# Patient Record
Sex: MALE | Race: WHITE | Employment: OTHER | URBAN - METROPOLITAN AREA
[De-identification: names, ages, dates, MRNs, and addresses within clinical notes are randomized per-mention and may not be internally consistent; named-entity substitution may affect disease eponyms.]

---

## 2018-06-04 PROBLEM — R47.01 EXPRESSIVE APHASIA: Status: ACTIVE | Noted: 2018-06-04

## 2018-06-04 PROBLEM — Z87.820 HISTORY OF TRAUMATIC BRAIN INJURY: Status: ACTIVE | Noted: 2018-06-04

## 2018-06-28 PROBLEM — M25.521 PAIN OF BOTH ELBOWS: Status: ACTIVE | Noted: 2018-06-28

## 2018-06-28 PROBLEM — M25.522 PAIN OF BOTH ELBOWS: Status: ACTIVE | Noted: 2018-06-28

## 2018-09-04 PROBLEM — F43.0 ACUTE STRESS DISORDER: Status: ACTIVE | Noted: 2018-09-04

## 2018-09-04 PROBLEM — G47.20 SLEEP PATTERN DISTURBANCE: Status: ACTIVE | Noted: 2018-09-04

## 2018-09-04 PROBLEM — N52.9 PRIMARY ERECTILE DYSFUNCTION: Status: ACTIVE | Noted: 2018-03-06

## 2018-09-04 PROBLEM — B07.0 VERRUCA PLANTARIS: Status: ACTIVE | Noted: 2018-09-04

## 2018-09-04 PROBLEM — F41.8 MIXED ANXIETY AND DEPRESSIVE DISORDER: Status: ACTIVE | Noted: 2018-09-04

## 2018-09-04 PROBLEM — F41.1 GENERALIZED ANXIETY DISORDER: Status: ACTIVE | Noted: 2018-09-04

## 2018-09-04 PROBLEM — S09.90XA CLOSED INJURY OF HEAD: Status: ACTIVE | Noted: 2018-09-04

## 2018-09-07 PROBLEM — M54.31 SCIATICA OF RIGHT SIDE: Status: ACTIVE | Noted: 2018-09-07

## 2018-10-16 PROBLEM — M43.16 SPONDYLOLISTHESIS OF LUMBAR REGION: Status: ACTIVE | Noted: 2018-10-16

## 2018-12-04 PROBLEM — F32.5 MAJOR DEPRESSIVE DISORDER WITH SINGLE EPISODE, IN FULL REMISSION (HCC): Status: ACTIVE | Noted: 2018-12-04

## 2018-12-17 PROBLEM — D35.02 ADRENAL ADENOMA, LEFT: Status: ACTIVE | Noted: 2018-12-17

## 2018-12-19 ENCOUNTER — HOSPITAL ENCOUNTER (OUTPATIENT)
Dept: PHYSICAL THERAPY | Facility: HOSPITAL | Age: 58
Discharge: HOME | End: 2018-12-19
Attending: ORTHOPAEDIC SURGERY
Payer: MEDICARE

## 2018-12-19 DIAGNOSIS — M43.16 SPONDYLOLISTHESIS, LUMBAR REGION: ICD-10-CM

## 2018-12-19 RX ORDER — MULTIVIT-MIN/IRON FUM/FOLIC AC 7.5 MG-4
1 TABLET ORAL DAILY
Status: ON HOLD | COMMUNITY
End: 2019-01-24

## 2018-12-19 RX ORDER — CHLORAL HYDRATE 500 MG
1 CAPSULE ORAL DAILY
Status: ON HOLD | COMMUNITY
End: 2019-01-23

## 2018-12-19 RX ORDER — MULTIVIT WITH MINERALS/LUTEIN
250 TABLET ORAL DAILY
Status: ON HOLD | COMMUNITY
End: 2019-01-24

## 2018-12-19 RX ORDER — CHLORAL HYDRATE 500 MG
1000 CAPSULE ORAL DAILY
Status: ON HOLD | COMMUNITY
End: 2019-01-24

## 2019-01-03 PROCEDURE — 81003 URINALYSIS AUTO W/O SCOPE: CPT | Performed by: FAMILY MEDICINE

## 2019-01-22 ENCOUNTER — ANESTHESIA EVENT (OUTPATIENT)
Dept: SURGERY | Facility: HOSPITAL | Age: 59
End: 2019-01-22

## 2019-01-23 ENCOUNTER — HOSPITAL ENCOUNTER (INPATIENT)
Facility: HOSPITAL | Age: 59
LOS: 1 days | Discharge: HOME | DRG: 455 | End: 2019-01-24
Attending: ORTHOPAEDIC SURGERY | Admitting: ORTHOPAEDIC SURGERY
Payer: MEDICARE

## 2019-01-23 ENCOUNTER — APPOINTMENT (OUTPATIENT)
Dept: GENERAL RADIOLOGY | Facility: HOSPITAL | Age: 59
DRG: 455 | End: 2019-01-23
Attending: ORTHOPAEDIC SURGERY
Payer: MEDICARE

## 2019-01-23 ENCOUNTER — ANESTHESIA (OUTPATIENT)
Dept: SURGERY | Facility: HOSPITAL | Age: 59
End: 2019-01-23

## 2019-01-23 DIAGNOSIS — M43.16 SPONDYLOLISTHESIS, LUMBAR REGION: Primary | ICD-10-CM

## 2019-01-23 LAB
ERYTHROCYTE [DISTWIDTH] IN BLOOD BY AUTOMATED COUNT: 12.1 % (ref 11.5–16)
HCT VFR BLD AUTO: 40.3 % (ref 37–53)
HGB BLD-MCNC: 13.5 G/DL (ref 13–17)
MCH RBC QN AUTO: 30.5 PG (ref 27–33.2)
MCHC RBC AUTO-ENTMCNC: 33.5 G/DL (ref 31–37)
MCV RBC AUTO: 91 FL (ref 80–99)
PLATELET # BLD AUTO: 248 10(3)UL (ref 150–450)
RBC # BLD AUTO: 4.43 X10(6)UL (ref 4.3–5.7)
RED CELL DISTRIBUTION WIDTH-SD: 40.6 FL (ref 35.1–46.3)
WBC # BLD AUTO: 10.9 X10(3) UL (ref 4–13)

## 2019-01-23 PROCEDURE — 76000 FLUOROSCOPY <1 HR PHYS/QHP: CPT | Performed by: ORTHOPAEDIC SURGERY

## 2019-01-23 PROCEDURE — 0SG3071 FUSION OF LUMBOSACRAL JOINT WITH AUTOLOGOUS TISSUE SUBSTITUTE, POSTERIOR APPROACH, POSTERIOR COLUMN, OPEN APPROACH: ICD-10-PCS | Performed by: ORTHOPAEDIC SURGERY

## 2019-01-23 PROCEDURE — 01NB0ZZ RELEASE LUMBAR NERVE, OPEN APPROACH: ICD-10-PCS | Performed by: ORTHOPAEDIC SURGERY

## 2019-01-23 PROCEDURE — 07DS3ZZ EXTRACTION OF VERTEBRAL BONE MARROW, PERCUTANEOUS APPROACH: ICD-10-PCS | Performed by: ORTHOPAEDIC SURGERY

## 2019-01-23 PROCEDURE — 95870 NDL EMG LMTD STD MUSC 1 XTR: CPT | Performed by: ORTHOPAEDIC SURGERY

## 2019-01-23 PROCEDURE — 95938 SOMATOSENSORY TESTING: CPT | Performed by: ORTHOPAEDIC SURGERY

## 2019-01-23 PROCEDURE — 85027 COMPLETE CBC AUTOMATED: CPT | Performed by: PHYSICIAN ASSISTANT

## 2019-01-23 PROCEDURE — 88311 DECALCIFY TISSUE: CPT | Performed by: ORTHOPAEDIC SURGERY

## 2019-01-23 PROCEDURE — 0SG30AJ FUSION OF LUMBOSACRAL JOINT WITH INTERBODY FUSION DEVICE, POSTERIOR APPROACH, ANTERIOR COLUMN, OPEN APPROACH: ICD-10-PCS | Performed by: ORTHOPAEDIC SURGERY

## 2019-01-23 PROCEDURE — 00BT0ZZ EXCISION OF SPINAL MENINGES, OPEN APPROACH: ICD-10-PCS | Performed by: ORTHOPAEDIC SURGERY

## 2019-01-23 PROCEDURE — 88304 TISSUE EXAM BY PATHOLOGIST: CPT | Performed by: ORTHOPAEDIC SURGERY

## 2019-01-23 PROCEDURE — 0ST40ZZ RESECTION OF LUMBOSACRAL DISC, OPEN APPROACH: ICD-10-PCS | Performed by: ORTHOPAEDIC SURGERY

## 2019-01-23 PROCEDURE — 95940 IONM IN OPERATNG ROOM 15 MIN: CPT | Performed by: ORTHOPAEDIC SURGERY

## 2019-01-23 PROCEDURE — 4A11X4G MONITORING OF PERIPHERAL NERVOUS ELECTRICAL ACTIVITY, INTRAOPERATIVE, EXTERNAL APPROACH: ICD-10-PCS | Performed by: ORTHOPAEDIC SURGERY

## 2019-01-23 DEVICE — RELINE LCK SCRW 5.5 OPEN TULIP: Type: IMPLANTABLE DEVICE | Site: BACK | Status: FUNCTIONAL

## 2019-01-23 DEVICE — OSTEOCEL PRO MEDIUM: Type: IMPLANTABLE DEVICE | Site: BACK | Status: FUNCTIONAL

## 2019-01-23 DEVICE — RELINE MAS MOD REDUCTION EXT: Type: IMPLANTABLE DEVICE | Site: BACK | Status: FUNCTIONAL

## 2019-01-23 DEVICE — RELINE MAS MOD SCREW 6.5X50 2C: Type: IMPLANTABLE DEVICE | Site: BACK | Status: FUNCTIONAL

## 2019-01-23 DEVICE — RELINE MAS TI ROD 5.5X30 LRDTC: Type: IMPLANTABLE DEVICE | Site: BACK | Status: FUNCTIONAL

## 2019-01-23 DEVICE — OSTEOCEL PRO LARGE BULK BUY: Type: IMPLANTABLE DEVICE | Site: BACK | Status: FUNCTIONAL

## 2019-01-23 DEVICE — 8.12-MODULUS TLIF 8X10X30 12: Type: IMPLANTABLE DEVICE | Site: BACK | Status: FUNCTIONAL

## 2019-01-23 RX ORDER — HYDROCODONE BITARTRATE AND ACETAMINOPHEN 5; 325 MG/1; MG/1
2 TABLET ORAL AS NEEDED
Status: DISCONTINUED | OUTPATIENT
Start: 2019-01-23 | End: 2019-01-23 | Stop reason: HOSPADM

## 2019-01-23 RX ORDER — MORPHINE SULFATE 4 MG/ML
1 INJECTION, SOLUTION INTRAMUSCULAR; INTRAVENOUS EVERY 2 HOUR PRN
Status: DISCONTINUED | OUTPATIENT
Start: 2019-01-23 | End: 2019-01-24

## 2019-01-23 RX ORDER — ONDANSETRON 2 MG/ML
4 INJECTION INTRAMUSCULAR; INTRAVENOUS AS NEEDED
Status: DISCONTINUED | OUTPATIENT
Start: 2019-01-23 | End: 2019-01-23 | Stop reason: HOSPADM

## 2019-01-23 RX ORDER — METOCLOPRAMIDE HYDROCHLORIDE 5 MG/ML
10 INJECTION INTRAMUSCULAR; INTRAVENOUS AS NEEDED
Status: DISCONTINUED | OUTPATIENT
Start: 2019-01-23 | End: 2019-01-23 | Stop reason: HOSPADM

## 2019-01-23 RX ORDER — DIAZEPAM 5 MG/ML
INJECTION, SOLUTION INTRAMUSCULAR; INTRAVENOUS
Status: COMPLETED
Start: 2019-01-23 | End: 2019-01-23

## 2019-01-23 RX ORDER — DIAZEPAM 5 MG/ML
5 INJECTION, SOLUTION INTRAMUSCULAR; INTRAVENOUS ONCE
Status: COMPLETED | OUTPATIENT
Start: 2019-01-23 | End: 2019-01-23

## 2019-01-23 RX ORDER — ONDANSETRON 2 MG/ML
4 INJECTION INTRAMUSCULAR; INTRAVENOUS ONCE
Status: COMPLETED | OUTPATIENT
Start: 2019-01-23 | End: 2019-01-23

## 2019-01-23 RX ORDER — MORPHINE SULFATE 15 MG/1
15 TABLET ORAL EVERY 4 HOURS PRN
Status: DISCONTINUED | OUTPATIENT
Start: 2019-01-23 | End: 2019-01-24

## 2019-01-23 RX ORDER — METOCLOPRAMIDE HYDROCHLORIDE 5 MG/ML
10 INJECTION INTRAMUSCULAR; INTRAVENOUS EVERY 6 HOURS PRN
Status: DISCONTINUED | OUTPATIENT
Start: 2019-01-23 | End: 2019-01-24

## 2019-01-23 RX ORDER — ONDANSETRON 2 MG/ML
4 INJECTION INTRAMUSCULAR; INTRAVENOUS EVERY 4 HOURS PRN
Status: DISCONTINUED | OUTPATIENT
Start: 2019-01-23 | End: 2019-01-24

## 2019-01-23 RX ORDER — LORAZEPAM 0.5 MG/1
0.5 TABLET ORAL 2 TIMES DAILY
Status: DISCONTINUED | OUTPATIENT
Start: 2019-01-23 | End: 2019-01-24

## 2019-01-23 RX ORDER — SENNOSIDES 8.6 MG
17.2 TABLET ORAL NIGHTLY
Status: DISCONTINUED | OUTPATIENT
Start: 2019-01-23 | End: 2019-01-24

## 2019-01-23 RX ORDER — DOCUSATE SODIUM 100 MG/1
100 CAPSULE, LIQUID FILLED ORAL 2 TIMES DAILY
Status: DISCONTINUED | OUTPATIENT
Start: 2019-01-23 | End: 2019-01-24

## 2019-01-23 RX ORDER — GABAPENTIN 600 MG/1
600 TABLET ORAL ONCE
Status: COMPLETED | OUTPATIENT
Start: 2019-01-23 | End: 2019-01-23

## 2019-01-23 RX ORDER — OXYCODONE HYDROCHLORIDE 10 MG/1
10 TABLET ORAL EVERY 4 HOURS PRN
Status: DISCONTINUED | OUTPATIENT
Start: 2019-01-23 | End: 2019-01-24

## 2019-01-23 RX ORDER — DIPHENHYDRAMINE HYDROCHLORIDE 50 MG/ML
12.5 INJECTION INTRAMUSCULAR; INTRAVENOUS AS NEEDED
Status: DISCONTINUED | OUTPATIENT
Start: 2019-01-23 | End: 2019-01-23 | Stop reason: HOSPADM

## 2019-01-23 RX ORDER — DIPHENHYDRAMINE HYDROCHLORIDE 50 MG/ML
25 INJECTION INTRAMUSCULAR; INTRAVENOUS EVERY 4 HOURS PRN
Status: DISCONTINUED | OUTPATIENT
Start: 2019-01-23 | End: 2019-01-24

## 2019-01-23 RX ORDER — ACETAMINOPHEN 500 MG
1000 TABLET ORAL ONCE
Status: DISCONTINUED | OUTPATIENT
Start: 2019-01-23 | End: 2019-01-23 | Stop reason: HOSPADM

## 2019-01-23 RX ORDER — NALOXONE HYDROCHLORIDE 0.4 MG/ML
80 INJECTION, SOLUTION INTRAMUSCULAR; INTRAVENOUS; SUBCUTANEOUS AS NEEDED
Status: DISCONTINUED | OUTPATIENT
Start: 2019-01-23 | End: 2019-01-23 | Stop reason: HOSPADM

## 2019-01-23 RX ORDER — OXYCODONE HYDROCHLORIDE 5 MG/1
5 TABLET ORAL EVERY 4 HOURS PRN
Status: DISCONTINUED | OUTPATIENT
Start: 2019-01-23 | End: 2019-01-24

## 2019-01-23 RX ORDER — BUPIVACAINE HYDROCHLORIDE AND EPINEPHRINE 5; 5 MG/ML; UG/ML
INJECTION, SOLUTION EPIDURAL; INTRACAUDAL; PERINEURAL AS NEEDED
Status: DISCONTINUED | OUTPATIENT
Start: 2019-01-23 | End: 2019-01-23 | Stop reason: HOSPADM

## 2019-01-23 RX ORDER — LISINOPRIL 10 MG/1
10 TABLET ORAL DAILY
Status: DISCONTINUED | OUTPATIENT
Start: 2019-01-23 | End: 2019-01-24

## 2019-01-23 RX ORDER — SODIUM CHLORIDE 9 MG/ML
INJECTION, SOLUTION INTRAVENOUS CONTINUOUS
Status: DISCONTINUED | OUTPATIENT
Start: 2019-01-23 | End: 2019-01-24

## 2019-01-23 RX ORDER — SODIUM CHLORIDE, SODIUM LACTATE, POTASSIUM CHLORIDE, CALCIUM CHLORIDE 600; 310; 30; 20 MG/100ML; MG/100ML; MG/100ML; MG/100ML
INJECTION, SOLUTION INTRAVENOUS CONTINUOUS
Status: DISCONTINUED | OUTPATIENT
Start: 2019-01-23 | End: 2019-01-23 | Stop reason: HOSPADM

## 2019-01-23 RX ORDER — BISACODYL 10 MG
10 SUPPOSITORY, RECTAL RECTAL
Status: DISCONTINUED | OUTPATIENT
Start: 2019-01-23 | End: 2019-01-24

## 2019-01-23 RX ORDER — TIZANIDINE 4 MG/1
4 TABLET ORAL 3 TIMES DAILY PRN
Status: DISCONTINUED | OUTPATIENT
Start: 2019-01-23 | End: 2019-01-24

## 2019-01-23 RX ORDER — SODIUM CHLORIDE, SODIUM LACTATE, POTASSIUM CHLORIDE, CALCIUM CHLORIDE 600; 310; 30; 20 MG/100ML; MG/100ML; MG/100ML; MG/100ML
INJECTION, SOLUTION INTRAVENOUS CONTINUOUS
Status: DISCONTINUED | OUTPATIENT
Start: 2019-01-23 | End: 2019-01-23

## 2019-01-23 RX ORDER — HYDROMORPHONE HYDROCHLORIDE 1 MG/ML
0.4 INJECTION, SOLUTION INTRAMUSCULAR; INTRAVENOUS; SUBCUTANEOUS EVERY 5 MIN PRN
Status: DISCONTINUED | OUTPATIENT
Start: 2019-01-23 | End: 2019-01-23 | Stop reason: HOSPADM

## 2019-01-23 RX ORDER — LABETALOL HYDROCHLORIDE 5 MG/ML
5 INJECTION, SOLUTION INTRAVENOUS EVERY 5 MIN PRN
Status: DISCONTINUED | OUTPATIENT
Start: 2019-01-23 | End: 2019-01-23 | Stop reason: HOSPADM

## 2019-01-23 RX ORDER — CEFAZOLIN SODIUM/WATER 2 G/20 ML
2 SYRINGE (ML) INTRAVENOUS EVERY 8 HOURS
Status: COMPLETED | OUTPATIENT
Start: 2019-01-23 | End: 2019-01-24

## 2019-01-23 RX ORDER — DIPHENHYDRAMINE HCL 25 MG
25 CAPSULE ORAL EVERY 4 HOURS PRN
Status: DISCONTINUED | OUTPATIENT
Start: 2019-01-23 | End: 2019-01-24

## 2019-01-23 RX ORDER — MORPHINE SULFATE 4 MG/ML
2 INJECTION, SOLUTION INTRAMUSCULAR; INTRAVENOUS EVERY 2 HOUR PRN
Status: DISCONTINUED | OUTPATIENT
Start: 2019-01-23 | End: 2019-01-24

## 2019-01-23 RX ORDER — MORPHINE SULFATE 4 MG/ML
4 INJECTION, SOLUTION INTRAMUSCULAR; INTRAVENOUS EVERY 2 HOUR PRN
Status: DISCONTINUED | OUTPATIENT
Start: 2019-01-23 | End: 2019-01-24

## 2019-01-23 RX ORDER — SODIUM PHOSPHATE, DIBASIC AND SODIUM PHOSPHATE, MONOBASIC 7; 19 G/133ML; G/133ML
1 ENEMA RECTAL ONCE AS NEEDED
Status: DISCONTINUED | OUTPATIENT
Start: 2019-01-23 | End: 2019-01-24

## 2019-01-23 RX ORDER — CEFAZOLIN SODIUM/WATER 2 G/20 ML
2 SYRINGE (ML) INTRAVENOUS ONCE
Status: COMPLETED | OUTPATIENT
Start: 2019-01-23 | End: 2019-01-23

## 2019-01-23 RX ORDER — DIAZEPAM 5 MG/1
5 TABLET ORAL EVERY 6 HOURS PRN
Status: DISCONTINUED | OUTPATIENT
Start: 2019-01-23 | End: 2019-01-24

## 2019-01-23 RX ORDER — HYDROCODONE BITARTRATE AND ACETAMINOPHEN 5; 325 MG/1; MG/1
1 TABLET ORAL AS NEEDED
Status: DISCONTINUED | OUTPATIENT
Start: 2019-01-23 | End: 2019-01-23 | Stop reason: HOSPADM

## 2019-01-23 RX ORDER — GABAPENTIN 300 MG/1
900 CAPSULE ORAL NIGHTLY
Status: DISCONTINUED | OUTPATIENT
Start: 2019-01-23 | End: 2019-01-24

## 2019-01-23 RX ORDER — CELECOXIB 200 MG/1
200 CAPSULE ORAL ONCE
Status: COMPLETED | OUTPATIENT
Start: 2019-01-23 | End: 2019-01-23

## 2019-01-23 RX ORDER — BACITRACIN 50000 [USP'U]/1
INJECTION, POWDER, LYOPHILIZED, FOR SOLUTION INTRAMUSCULAR AS NEEDED
Status: DISCONTINUED | OUTPATIENT
Start: 2019-01-23 | End: 2019-01-23 | Stop reason: HOSPADM

## 2019-01-23 RX ORDER — POLYETHYLENE GLYCOL 3350 17 G/17G
17 POWDER, FOR SOLUTION ORAL DAILY PRN
Status: DISCONTINUED | OUTPATIENT
Start: 2019-01-23 | End: 2019-01-24

## 2019-01-23 RX ORDER — MELATONIN
3 NIGHTLY
Status: DISCONTINUED | OUTPATIENT
Start: 2019-01-23 | End: 2019-01-24

## 2019-01-23 NOTE — ANESTHESIA PREPROCEDURE EVALUATION
PRE-OP EVALUATION    Patient Name: Cali Banks    Pre-op Diagnosis: Spondylolisthesis, lumbar region [M43.16]    Procedure(s):  LUMBAR 5-SACRAL 1 DECOMPRESSION WITH INSTRUMENTED FUSION      Surgeon(s) and Role:     * Lino Yanes MD - Primary    Pre- Evaluation    Patient summary reviewed. Anesthetic Complications  (-) history of anesthetic complications         GI/Hepatic/Renal    Negative GI/hepatic/renal ROS.  (-) GERD                           Cardiovascular      ECG reviewed.   Exercise toleranc

## 2019-01-23 NOTE — BRIEF OP NOTE
Pre-Operative Diagnosis: Spondylolisthesis, lumbar region [M43.16]     Post-Operative Diagnosis: same      Procedure Performed:   Procedure(s):  LUMBAR 5-SACRAL 1 DECOMPRESSION WITH INSTRUMENTED FUSION      Surgeon(s) and Role:     * MD Linsey Ruvalcaba P

## 2019-01-23 NOTE — H&P
Noé Samaniego is a 63year old male.    Patient presents with:  Pre-Op Exam     HPI:    Patient is here for pre-op appt for L5-S1 decompression with Dr. Giovani Rubio.     Patient denies any problems with anesthesia in the past.  Patient is able to walk up 2 fligh for afib; Dr. Deidre Lopez   • COLONOSCOPY   2012     repeat 10 years   • HERNIA SURGERY   2010            Family History   Problem Relation Age of Onset   • Cancer Father           stomach   • Heart Disorder Father           a fib   • Hypertension Father     • Oral Tab Take 1 tablet (25 mg total) by mouth every morning before breakfast. Disp: 90 tablet Rfl: 1   Meloxicam (MOBIC) 15 MG Oral Tab Take 1 tablet (15 mg total) by mouth daily.  Disp: 30 tablet Rfl: 2   Cyclobenzaprine HCl 10 MG Oral Tab Take 1 tablet (1 neurologic injury, paralysis and worsening of symptoms. No guarantees made. If fusion recommended risks of pseaudarthosis, hardware failure/migration were verbalized.

## 2019-01-23 NOTE — ANESTHESIA POSTPROCEDURE EVALUATION
[address] Patient Status:  Surgery Admit   Age/Gender 63year old male MRN [id number]   Location [address] Attending Nabor Lopez MD   Hosp Day # 0 PCP Jeffrey Munson MD       Anesthesia P

## 2019-01-23 NOTE — CONSULTS
.  Reason for consult: periop mgmt    Consulted by: Dr. Lelo Craven    PCP: Linsey Arambula MD      History of Present Illness: Patient is a 63year old male with PMH sig for lumbar spondylolisthesis who presented for decompression/fusion.     No sob/dizz Disp:  Rfl: 3   aspirin EC 81 MG Oral Tab EC Take 81 mg by mouth daily. Disp:  Rfl:    hydrochlorothiazide 25 MG Oral Tab Take 1 tablet (25 mg total) by mouth every morning before breakfast. Disp: 90 tablet Rfl: 1       Scheduled Medication:  • Senna  17. 2 Left arm)   Pulse 72   Temp 98 °F (36.7 °C) (Oral)   Resp 16   Ht 175.3 cm (5' 9\")   Wt 177 lb 11.1 oz (80.6 kg)   SpO2 95%   BMI 26.24 kg/m²   General:  Alert, no distress, appears stated age.    Head:  Normocephalic, without obvious abnormality, atraumat spondylolisthesis, lumbar region. COMPARISON: Lumbar spine MRI 10/8/2018.  TECHNIQUE: Multiplanar multisequence MRI of the lumbar spine was performed utilizing the [place] [address] routine adult lumbar spine protocol without intravenous contrast. Elke Laguna bilateral L5 spondylolysis. There is uncovering of pseudodisc bulge. There is bilateral facet hypertrophic changes.  There is no significant narrowing of the spinal canal. There is moderate narrowing of the neural foramen bilaterally however there is distor in house    Patient and/or patient's family given opportunity to ask questions and note understanding and agreeing with therapeutic plan as outlined      Thank you for allowing me to participate in the care of this patient.      Thank Moe Posey MD

## 2019-01-24 VITALS
SYSTOLIC BLOOD PRESSURE: 129 MMHG | HEIGHT: 69 IN | HEART RATE: 71 BPM | OXYGEN SATURATION: 92 % | TEMPERATURE: 98 F | WEIGHT: 177.69 LBS | DIASTOLIC BLOOD PRESSURE: 70 MMHG | BODY MASS INDEX: 26.32 KG/M2 | RESPIRATION RATE: 16 BRPM

## 2019-01-24 PROBLEM — M43.16 SPONDYLOLISTHESIS, LUMBAR REGION: Status: ACTIVE | Noted: 2018-10-16

## 2019-01-24 LAB
ERYTHROCYTE [DISTWIDTH] IN BLOOD BY AUTOMATED COUNT: 12.5 % (ref 11.5–16)
HCT VFR BLD AUTO: 37.2 % (ref 37–53)
HGB BLD-MCNC: 12.6 G/DL (ref 13–17)
MCH RBC QN AUTO: 31.2 PG (ref 27–33.2)
MCHC RBC AUTO-ENTMCNC: 33.9 G/DL (ref 31–37)
MCV RBC AUTO: 92.1 FL (ref 80–99)
PLATELET # BLD AUTO: 240 10(3)UL (ref 150–450)
RBC # BLD AUTO: 4.04 X10(6)UL (ref 4.3–5.7)
RED CELL DISTRIBUTION WIDTH-SD: 42.2 FL (ref 35.1–46.3)
WBC # BLD AUTO: 14.5 X10(3) UL (ref 4–13)

## 2019-01-24 PROCEDURE — 97530 THERAPEUTIC ACTIVITIES: CPT

## 2019-01-24 PROCEDURE — 97161 PT EVAL LOW COMPLEX 20 MIN: CPT

## 2019-01-24 PROCEDURE — 97535 SELF CARE MNGMENT TRAINING: CPT

## 2019-01-24 PROCEDURE — 97165 OT EVAL LOW COMPLEX 30 MIN: CPT

## 2019-01-24 PROCEDURE — 97116 GAIT TRAINING THERAPY: CPT

## 2019-01-24 PROCEDURE — 85027 COMPLETE CBC AUTOMATED: CPT | Performed by: PHYSICIAN ASSISTANT

## 2019-01-24 RX ORDER — HYDROCODONE BITARTRATE AND ACETAMINOPHEN 10; 325 MG/1; MG/1
1 TABLET ORAL EVERY 6 HOURS PRN
Qty: 40 TABLET | Refills: 0 | Status: SHIPPED | OUTPATIENT
Start: 2019-01-24 | End: 2019-03-18

## 2019-01-24 RX ORDER — HYDROCODONE BITARTRATE AND ACETAMINOPHEN 10; 325 MG/1; MG/1
2 TABLET ORAL EVERY 6 HOURS PRN
Status: DISCONTINUED | OUTPATIENT
Start: 2019-01-24 | End: 2019-01-24

## 2019-01-24 RX ORDER — POLYETHYLENE GLYCOL 3350 17 G/17G
17 POWDER, FOR SOLUTION ORAL DAILY PRN
Qty: 10 EACH | Refills: 0 | Status: SHIPPED | COMMUNITY
Start: 2019-01-24 | End: 2019-02-11 | Stop reason: ALTCHOICE

## 2019-01-24 RX ORDER — PSEUDOEPHEDRINE HCL 30 MG
TABLET ORAL
Qty: 60 CAPSULE | Refills: 0 | Status: SHIPPED | COMMUNITY
Start: 2019-01-24 | End: 2019-03-18

## 2019-01-24 RX ORDER — HYDROCODONE BITARTRATE AND ACETAMINOPHEN 10; 325 MG/1; MG/1
1 TABLET ORAL EVERY 6 HOURS PRN
Status: DISCONTINUED | OUTPATIENT
Start: 2019-01-24 | End: 2019-01-24

## 2019-01-24 NOTE — PROGRESS NOTES
[place] Hospitalist note    PCP: Wesley Zazueta MD    Chief Complaint:  Sp lumbar fusion    SUBJECTIVE:  Doing well, no sob/dizziness/nausea    OBJECTIVE:  Temp:  [97.8 °F (36.6 °C)-98.3 °F (36.8 °C)] 97.8 °F (36.6 °C)  Pulse:  [71-87] 71  Resp: hydroxide, bisacodyl, FLEET ENEMA, ondansetron HCl, Metoclopramide HCl, diphenhydrAMINE **OR** DiphenhydrAMINE HCl, morphINE sulfate **OR** morphINE sulfate **OR** morphINE sulfate, TiZANidine HCl, diazepam       Assessment/Plan:     # s/p lumbar 5-sacral

## 2019-01-24 NOTE — PHYSICAL THERAPY NOTE
PHYSICAL THERAPY QUICK EVALUATION - INPATIENT    Room Number: 379/379-A  Evaluation Date: 1/24/2019  Presenting Problem: Spondylolisthesis, L5-S1 decompression  Physician Order: PT Eval and Treat    Problem List  Active Problems:    Spondylolisthesis, rachelle INPATIENT SHORT FORM - BASIC MOBILITY  How much difficulty does the patient currently have. ..  -   Turning over in bed (including adjusting bedclothes, sheets and blankets)?: A Little   -   Sitting down on and standing up from a chair with arms (e.g., whee session. Mod I in his bed mobilities, min A in transfers huan with low chair and with increase time with his ambulation with RW as support. Noted decrease stride length with task. Able to asc/kaite 4 stesp with CGA, seated rest between bouts due to pain.  Disc

## 2019-01-24 NOTE — OCCUPATIONAL THERAPY NOTE
OCCUPATIONAL THERAPY QUICK EVALUATION - INPATIENT    Room Number: 379/379-A  Evaluation Date: 1/24/2019     Type of Evaluation: Quick Eval  Presenting Problem: L5-S1 TLIF    Physician Order: IP Consult to Occupational Therapy  Reason for Therapy:  ADL/IADL mechanics;Repositioning    COGNITION  Intact    RANGE OF MOTION AND STRENGTH ASSESSMENT  Upper extremity ROM is within functional limits     Upper extremity strength is within functional limits     ACTIVITIES OF DAILY LIVING ASSESSMENT  AM-PAC ‘6-Clicks’ I adhering to spinal precautions. No further acute skilled OT needs, pt and wife verbalize/demonstrate understanding of adaptive ADL performance. Pt and wife agreeable to OT recs/plan.  D/C OT services    Patient Complexity  Occupational Profile/Medical Histo

## 2019-01-24 NOTE — PROGRESS NOTES
S: Moderate back pain with no leg pain. Pain with transitioning. He was up twice yesterday. No new numbness or weakness. Pre op weakness is improved. No other concerns. Inspection:  Awake alert No acute distress.  No difficulty breathing     Blood

## 2019-01-28 NOTE — OPERATIVE REPORT
[facility]    PATIENT'S NAME: Codie Elva   ATTENDING PHYSICIAN: Edmar Rock M.D. OPERATING PHYSICIAN: Edmar Rock M.D.    PATIENT ACCOUNT#:   [de-identified]    LOCATION:  [address] [place] [address]  MEDICAL RECORD #:   [id number]       DATE OF BIRTH:  [birth year] was sterilely prepped and draped. AP fluoroscopy was wheeled in. We marked pedicles at all levels outlined above bilaterally.   Two separate incisions 1-1/2 fingerbreadths lateral to this approximately an inch and a half long were made with a larger incis measurements documenting a mismatch between pelvic incidence and lumbar lordosis was made, thus documenting clinically significant kyphosis. Our attention first turned to the L5-S1 level. The facet was osteotomized and resected.   A bur was used to [facility] pituitary. It was sent to Pathology for analysis. Our attention then turned to utilization of the separate contralateral incision. With a retractor in place we localized the far lateral region of the level.   We examined areas more farther laterally to confirmed appropriate localization of our instrumentation. Valsalva maneuver confirmed that there was no spinal leak. The wound was thoroughly irrigated. Hemostasis was maintained. The fascia was reapproximated bilaterally using 1-0 Vicryl.   A 2-0 Vicr

## 2019-02-11 PROBLEM — N52.9 PRIMARY ERECTILE DYSFUNCTION: Status: RESOLVED | Noted: 2018-03-06 | Resolved: 2019-02-11

## 2019-02-13 ENCOUNTER — APPOINTMENT (OUTPATIENT)
Dept: ULTRASOUND IMAGING | Facility: HOSPITAL | Age: 59
End: 2019-02-13
Attending: EMERGENCY MEDICINE
Payer: MEDICARE

## 2019-02-13 ENCOUNTER — APPOINTMENT (OUTPATIENT)
Dept: CT IMAGING | Facility: HOSPITAL | Age: 59
End: 2019-02-13
Attending: EMERGENCY MEDICINE
Payer: MEDICARE

## 2019-02-13 ENCOUNTER — HOSPITAL ENCOUNTER (EMERGENCY)
Facility: HOSPITAL | Age: 59
Discharge: HOME | End: 2019-02-13
Attending: EMERGENCY MEDICINE
Payer: MEDICARE

## 2019-02-13 VITALS
RESPIRATION RATE: 10 BRPM | DIASTOLIC BLOOD PRESSURE: 80 MMHG | OXYGEN SATURATION: 98 % | HEIGHT: 69 IN | HEART RATE: 82 BPM | WEIGHT: 170 LBS | TEMPERATURE: 101 F | BODY MASS INDEX: 25.18 KG/M2 | SYSTOLIC BLOOD PRESSURE: 132 MMHG

## 2019-02-13 DIAGNOSIS — I26.99 ACUTE PULMONARY EMBOLISM WITHOUT ACUTE COR PULMONALE, UNSPECIFIED PULMONARY EMBOLISM TYPE (HCC): Primary | ICD-10-CM

## 2019-02-13 LAB
ALBUMIN SERPL-MCNC: 3.4 G/DL (ref 3.4–5)
ALBUMIN/GLOB SERPL: 0.7 {RATIO} (ref 1–2)
ALP LIVER SERPL-CCNC: 111 U/L (ref 45–117)
ALT SERPL-CCNC: 20 U/L (ref 16–61)
ANION GAP SERPL CALC-SCNC: 8 MMOL/L (ref 0–18)
APTT PPP: 43.9 SECONDS (ref 26.1–34.6)
AST SERPL-CCNC: 15 U/L (ref 15–37)
BASOPHILS # BLD AUTO: 0.02 X10(3) UL (ref 0–0.2)
BASOPHILS NFR BLD AUTO: 0.3 %
BILIRUB SERPL-MCNC: 0.3 MG/DL (ref 0.1–2)
BUN BLD-MCNC: 17 MG/DL (ref 7–18)
BUN/CREAT SERPL: 16.2 (ref 10–20)
CALCIUM BLD-MCNC: 9.4 MG/DL (ref 8.5–10.1)
CHLORIDE SERPL-SCNC: 101 MMOL/L (ref 98–107)
CO2 SERPL-SCNC: 33 MMOL/L (ref 21–32)
CREAT BLD-MCNC: 1.05 MG/DL (ref 0.7–1.3)
DEPRECATED RDW RBC AUTO: 37.1 FL (ref 35.1–46.3)
EOSINOPHIL # BLD AUTO: 0.22 X10(3) UL (ref 0–0.7)
EOSINOPHIL NFR BLD AUTO: 3.3 %
ERYTHROCYTE [DISTWIDTH] IN BLOOD BY AUTOMATED COUNT: 11.4 % (ref 11–15)
GLOBULIN PLAS-MCNC: 4.6 G/DL (ref 2.8–4.4)
GLUCOSE BLD-MCNC: 83 MG/DL (ref 70–99)
HCT VFR BLD AUTO: 38.4 % (ref 39–53)
HGB BLD-MCNC: 13.3 G/DL (ref 13–17.5)
IMM GRANULOCYTES # BLD AUTO: 0.02 X10(3) UL (ref 0–1)
IMM GRANULOCYTES NFR BLD: 0.3 %
INR BLD: 1.2 (ref 0.9–1.1)
LYMPHOCYTES # BLD AUTO: 1.27 X10(3) UL (ref 1–4)
LYMPHOCYTES NFR BLD AUTO: 18.9 %
M PROTEIN MFR SERPL ELPH: 8 G/DL (ref 6.4–8.2)
MCH RBC QN AUTO: 30.9 PG (ref 26–34)
MCHC RBC AUTO-ENTMCNC: 34.6 G/DL (ref 31–37)
MCV RBC AUTO: 89.3 FL (ref 80–100)
MONOCYTES # BLD AUTO: 0.45 X10(3) UL (ref 0.1–1)
MONOCYTES NFR BLD AUTO: 6.7 %
NEUTROPHILS # BLD AUTO: 4.74 X10 (3) UL (ref 1.5–7.7)
NEUTROPHILS # BLD AUTO: 4.74 X10(3) UL (ref 1.5–7.7)
NEUTROPHILS NFR BLD AUTO: 70.5 %
OSMOLALITY SERPL CALC.SUM OF ELEC: 295 MOSM/KG (ref 275–295)
PLATELET # BLD AUTO: 347 10(3)UL (ref 150–450)
POTASSIUM SERPL-SCNC: 4.2 MMOL/L (ref 3.5–5.1)
PSA SERPL DL<=0.01 NG/ML-MCNC: 15 SECONDS (ref 12–14.1)
RBC # BLD AUTO: 4.3 X10(6)UL (ref 4.3–5.7)
SODIUM SERPL-SCNC: 142 MMOL/L (ref 136–145)
WBC # BLD AUTO: 6.7 X10(3) UL (ref 4–11)

## 2019-02-13 PROCEDURE — 93010 ELECTROCARDIOGRAM REPORT: CPT

## 2019-02-13 PROCEDURE — 99285 EMERGENCY DEPT VISIT HI MDM: CPT

## 2019-02-13 PROCEDURE — 85025 COMPLETE CBC W/AUTO DIFF WBC: CPT | Performed by: EMERGENCY MEDICINE

## 2019-02-13 PROCEDURE — 36415 COLL VENOUS BLD VENIPUNCTURE: CPT

## 2019-02-13 PROCEDURE — 93970 EXTREMITY STUDY: CPT | Performed by: EMERGENCY MEDICINE

## 2019-02-13 PROCEDURE — 85730 THROMBOPLASTIN TIME PARTIAL: CPT | Performed by: EMERGENCY MEDICINE

## 2019-02-13 PROCEDURE — 80053 COMPREHEN METABOLIC PANEL: CPT | Performed by: EMERGENCY MEDICINE

## 2019-02-13 PROCEDURE — 71275 CT ANGIOGRAPHY CHEST: CPT | Performed by: EMERGENCY MEDICINE

## 2019-02-13 PROCEDURE — 85610 PROTHROMBIN TIME: CPT | Performed by: EMERGENCY MEDICINE

## 2019-02-13 PROCEDURE — 93005 ELECTROCARDIOGRAM TRACING: CPT

## 2019-02-13 NOTE — ED INITIAL ASSESSMENT (HPI)
Patient complaining of substernal chest pain/pressure that radiates to the back that began today. Patient relates he had a chest CT on Monday and was told he has a PE in each lung. Patient was prescribed Pradaxa at that time.

## 2019-02-14 LAB
ATRIAL RATE: 85 BPM
P AXIS: 67 DEGREES
P-R INTERVAL: 136 MS
Q-T INTERVAL: 378 MS
QRS DURATION: 92 MS
QTC CALCULATION (BEZET): 449 MS
R AXIS: 18 DEGREES
T AXIS: 11 DEGREES
VENTRICULAR RATE: 85 BPM

## 2019-02-14 NOTE — ED NOTES
Patient is 3 weeks s/p lumbar fusion currently being treated for pulmonary embolism with Pradaxa since 2/11/19. Patient was instructed to come in for any worsening SOB or chest pain.   Patient also states that he has had some pain and cramping to his right

## 2019-02-14 NOTE — ED PROVIDER NOTES
Patient Seen in: [facility] Emergency Department    History   Patient presents with:  Dyspnea MENDEZ SOB (respiratory)    Stated Complaint: Ottie Yadira PE and now having chest pain    HPI    61-year-old male presents emergency department who is complaining All other systems reviewed and negative except as noted above.     Physical Exam     ED Triage Vitals [02/13/19 1734]   /80   Pulse 92   Resp 22   Temp (!) 100.5 °F (38.1 °C)   Temp src Oral   SpO2 97 %   O2 Device None (Room air)       Current:BP Narrative: The following orders were created for panel order CBC WITH DIFFERENTIAL WITH PLATELET.   Procedure                               Abnormality         Status                     ---------                               -----------         ------ emergency department at 2052 hr. 2. Areas of consolidation/atelectasis in both lung bases are increased in the lingula and improved in the right lower lobe. The possibility of pulmonary infarcts is considered. 3. 4 mm right lower lobe nodule.   Optional f [address]  [phone]              Medications Prescribed:  Current Discharge Medication List

## 2019-02-25 PROBLEM — M54.50 ACUTE BILATERAL LOW BACK PAIN WITHOUT SCIATICA: Status: ACTIVE | Noted: 2019-02-25

## 2019-03-18 PROBLEM — M25.522 PAIN OF BOTH ELBOWS: Status: RESOLVED | Noted: 2018-06-28 | Resolved: 2019-03-18

## 2019-03-18 PROBLEM — M43.16 SPONDYLOLISTHESIS, LUMBAR REGION: Status: RESOLVED | Noted: 2018-10-16 | Resolved: 2019-03-18

## 2019-03-18 PROBLEM — S09.90XA CLOSED INJURY OF HEAD: Status: RESOLVED | Noted: 2018-09-04 | Resolved: 2019-03-18

## 2019-03-18 PROBLEM — Z98.1 S/P LUMBAR FUSION: Status: ACTIVE | Noted: 2019-03-18

## 2019-03-18 PROBLEM — M54.50 ACUTE BILATERAL LOW BACK PAIN WITHOUT SCIATICA: Status: RESOLVED | Noted: 2019-02-25 | Resolved: 2019-03-18

## 2019-03-18 PROBLEM — R91.1 PULMONARY NODULE, RIGHT: Status: ACTIVE | Noted: 2019-03-18

## 2019-03-18 PROBLEM — M25.521 PAIN OF BOTH ELBOWS: Status: RESOLVED | Noted: 2018-06-28 | Resolved: 2019-03-18

## 2019-03-18 PROBLEM — M54.31 SCIATICA OF RIGHT SIDE: Status: RESOLVED | Noted: 2018-09-07 | Resolved: 2019-03-18

## 2019-03-18 PROBLEM — B07.0 VERRUCA PLANTARIS: Status: RESOLVED | Noted: 2018-09-04 | Resolved: 2019-03-18

## 2019-06-12 PROCEDURE — 85305 CLOT INHIBIT PROT S TOTAL: CPT | Performed by: FAMILY MEDICINE

## 2019-06-12 PROCEDURE — 85300 ANTITHROMBIN III ACTIVITY: CPT | Performed by: FAMILY MEDICINE

## 2019-06-12 PROCEDURE — 81241 F5 GENE: CPT | Performed by: FAMILY MEDICINE

## 2019-06-12 PROCEDURE — G0452 MOLECULAR PATHOLOGY INTERPR: HCPCS | Performed by: FAMILY MEDICINE

## 2019-06-12 PROCEDURE — 85732 THROMBOPLASTIN TIME PARTIAL: CPT | Performed by: FAMILY MEDICINE

## 2019-06-12 PROCEDURE — 85306 CLOT INHIBIT PROT S FREE: CPT | Performed by: FAMILY MEDICINE

## 2019-06-12 PROCEDURE — 85610 PROTHROMBIN TIME: CPT | Performed by: FAMILY MEDICINE

## 2019-06-12 PROCEDURE — 85705 THROMBOPLASTIN INHIBITION: CPT | Performed by: FAMILY MEDICINE

## 2019-06-12 PROCEDURE — 85303 CLOT INHIBIT PROT C ACTIVITY: CPT | Performed by: FAMILY MEDICINE

## 2019-06-12 PROCEDURE — 81240 F2 GENE: CPT | Performed by: FAMILY MEDICINE

## 2019-06-12 PROCEDURE — 85613 RUSSELL VIPER VENOM DILUTED: CPT | Performed by: FAMILY MEDICINE

## 2019-06-12 PROCEDURE — 85302 CLOT INHIBIT PROT C ANTIGEN: CPT | Performed by: FAMILY MEDICINE

## 2019-08-14 PROBLEM — G56.02 CARPAL TUNNEL SYNDROME OF LEFT WRIST: Status: ACTIVE | Noted: 2019-08-14

## 2019-08-14 PROBLEM — G56.01 CARPAL TUNNEL SYNDROME OF RIGHT WRIST: Status: ACTIVE | Noted: 2019-08-14

## 2019-09-09 PROBLEM — R07.9 CHEST PAIN: Status: ACTIVE | Noted: 2019-09-09

## 2019-09-09 PROBLEM — R07.9 CHEST PAIN: Status: RESOLVED | Noted: 2019-09-09 | Resolved: 2019-09-09

## 2019-09-09 PROBLEM — Z86.711 HISTORY OF PULMONARY EMBOLISM: Status: ACTIVE | Noted: 2019-09-09

## 2020-03-12 PROBLEM — I77.1 TORTUOUS AORTA (HCC): Status: ACTIVE | Noted: 2020-03-12

## 2020-03-12 PROBLEM — I70.0 AORTIC ATHEROSCLEROSIS (HCC): Status: ACTIVE | Noted: 2020-03-12

## 2020-04-20 PROBLEM — G56.01 CARPAL TUNNEL SYNDROME OF RIGHT WRIST: Status: RESOLVED | Noted: 2019-08-14 | Resolved: 2020-04-20

## 2020-04-20 PROBLEM — E27.9 ADRENAL NODULE (HCC): Status: ACTIVE | Noted: 2020-04-20

## 2020-04-20 PROBLEM — G56.02 CARPAL TUNNEL SYNDROME OF LEFT WRIST: Status: RESOLVED | Noted: 2019-08-14 | Resolved: 2020-04-20

## 2020-04-20 PROBLEM — F43.0 ACUTE STRESS DISORDER: Status: RESOLVED | Noted: 2018-09-04 | Resolved: 2020-04-20

## 2020-04-20 PROBLEM — I26.99 OTHER PULMONARY EMBOLISM WITHOUT ACUTE COR PULMONALE, UNSPECIFIED CHRONICITY (HCC): Status: RESOLVED | Noted: 2020-04-20 | Resolved: 2020-04-20

## 2020-04-20 PROBLEM — E27.8 ADRENAL NODULE (HCC): Status: ACTIVE | Noted: 2020-04-20

## 2020-04-20 PROBLEM — M54.50 ACUTE BILATERAL LOW BACK PAIN WITHOUT SCIATICA: Status: RESOLVED | Noted: 2019-02-25 | Resolved: 2020-04-20

## 2020-04-20 PROBLEM — I26.99 OTHER PULMONARY EMBOLISM WITHOUT ACUTE COR PULMONALE, UNSPECIFIED CHRONICITY (HCC): Status: ACTIVE | Noted: 2020-04-20

## 2020-07-07 PROBLEM — I25.10 CORONARY ARTERY CALCIFICATION: Status: ACTIVE | Noted: 2020-07-07

## 2020-07-07 PROBLEM — I25.84 CORONARY ARTERY CALCIFICATION: Status: ACTIVE | Noted: 2020-07-07

## 2021-01-05 PROBLEM — F32.1 CURRENT MODERATE EPISODE OF MAJOR DEPRESSIVE DISORDER WITHOUT PRIOR EPISODE (HCC): Status: ACTIVE | Noted: 2018-12-04

## 2021-01-05 PROBLEM — F09 LATE EFFECT OF HEAD TRAUMA, COGNITIVE DEFICITS: Status: ACTIVE | Noted: 2021-01-05

## 2021-01-05 PROBLEM — S09.90XS LATE EFFECT OF HEAD TRAUMA, COGNITIVE DEFICITS: Status: ACTIVE | Noted: 2021-01-05

## 2021-01-05 PROBLEM — F51.04 PSYCHOPHYSIOLOGICAL INSOMNIA: Status: ACTIVE | Noted: 2021-01-05

## 2021-02-16 NOTE — IMAGING NOTE
Reviewed instructions to patient in preparation for gated study:  Check in [address] OP registration. No need for fasting but need to hold caffeine, decaf and chocolate.   Patient will take his Metoprolol 50 mg at 9 pm day before test and Metoprolol 50 mg

## 2021-02-18 ENCOUNTER — HOSPITAL ENCOUNTER (OUTPATIENT)
Dept: CT IMAGING | Facility: HOSPITAL | Age: 61
Discharge: HOME | End: 2021-02-18
Attending: INTERNAL MEDICINE
Payer: MEDICARE

## 2021-02-18 VITALS — SYSTOLIC BLOOD PRESSURE: 105 MMHG | DIASTOLIC BLOOD PRESSURE: 64 MMHG | HEART RATE: 52 BPM

## 2021-02-18 DIAGNOSIS — I25.84 CORONARY ARTERY CALCIFICATION: ICD-10-CM

## 2021-02-18 DIAGNOSIS — I10 ESSENTIAL HYPERTENSION: ICD-10-CM

## 2021-02-18 DIAGNOSIS — I10 BENIGN ESSENTIAL HTN: ICD-10-CM

## 2021-02-18 DIAGNOSIS — R00.2 PALPITATIONS: ICD-10-CM

## 2021-02-18 DIAGNOSIS — Z98.890 S/P ABLATION OF ATRIAL FIBRILLATION: ICD-10-CM

## 2021-02-18 DIAGNOSIS — E78.2 MIXED HYPERLIPIDEMIA: ICD-10-CM

## 2021-02-18 DIAGNOSIS — R07.2 PRECORDIAL PAIN: ICD-10-CM

## 2021-02-18 DIAGNOSIS — I25.10 CORONARY ARTERY CALCIFICATION: ICD-10-CM

## 2021-02-18 DIAGNOSIS — Z86.79 S/P ABLATION OF ATRIAL FIBRILLATION: ICD-10-CM

## 2021-02-18 LAB — CREAT BLD-MCNC: 1.6 MG/DL

## 2021-02-18 PROCEDURE — 82565 ASSAY OF CREATININE: CPT

## 2021-02-18 PROCEDURE — 75574 CT ANGIO HRT W/3D IMAGE: CPT | Performed by: INTERNAL MEDICINE

## 2021-02-18 RX ORDER — NITROGLYCERIN 0.4 MG/1
0.4 TABLET SUBLINGUAL ONCE
Status: COMPLETED | OUTPATIENT
Start: 2021-02-18 | End: 2021-02-18

## 2021-02-18 RX ADMIN — NITROGLYCERIN 0.4 MG: 0.4 TABLET SUBLINGUAL at 13:55:00

## 2021-02-18 NOTE — IMAGING NOTE
Received Janet Door to CT  [employer] working with CT [employer]. Verified name, , allergies. Pt denies use of long acting nitrates like, Imdur, Cialis, Levitra and Viagra. IV access with 20G angiocath to right antecubital area.  O2 applied via ze

## 2021-03-15 PROBLEM — M48.02 FORAMINAL STENOSIS OF CERVICAL REGION: Status: ACTIVE | Noted: 2021-03-15

## 2021-03-15 PROBLEM — M54.12 CERVICAL RADICULOPATHY: Status: ACTIVE | Noted: 2021-03-15

## 2021-04-23 PROBLEM — R29.898 RIGHT ARM WEAKNESS: Status: ACTIVE | Noted: 2021-04-23

## 2021-04-23 PROBLEM — M54.12 CERVICAL RADICULAR PAIN: Status: ACTIVE | Noted: 2021-03-15

## 2021-05-03 PROBLEM — F33.42 RECURRENT MAJOR DEPRESSIVE DISORDER, IN FULL REMISSION (HCC): Status: ACTIVE | Noted: 2018-12-04

## 2021-05-03 PROBLEM — S06.9XAS LATE EFFECT OF TRAUMATIC INJURY TO BRAIN (HCC): Status: ACTIVE | Noted: 2021-05-03

## 2021-05-03 PROBLEM — S06.9X9S LATE EFFECT OF TRAUMATIC INJURY TO BRAIN (HCC): Status: ACTIVE | Noted: 2021-05-03

## 2021-05-19 PROBLEM — F09 LATE EFFECT OF HEAD TRAUMA, COGNITIVE DEFICITS: Status: RESOLVED | Noted: 2021-01-05 | Resolved: 2021-05-19

## 2021-05-19 PROBLEM — S09.90XS LATE EFFECT OF HEAD TRAUMA, COGNITIVE DEFICITS: Status: RESOLVED | Noted: 2021-01-05 | Resolved: 2021-05-19

## 2021-06-03 PROCEDURE — 88311 DECALCIFY TISSUE: CPT | Performed by: ORTHOPAEDIC SURGERY

## 2021-06-03 PROCEDURE — 88304 TISSUE EXAM BY PATHOLOGIST: CPT | Performed by: ORTHOPAEDIC SURGERY

## 2021-06-28 PROBLEM — I70.0 THORACIC AORTIC ATHEROSCLEROSIS (HCC): Status: ACTIVE | Noted: 2020-03-12

## 2021-06-28 PROBLEM — I82.452 ACUTE DEEP VEIN THROMBOSIS (DVT) OF LEFT PERONEAL VEIN (HCC): Status: ACTIVE | Noted: 2021-06-28

## 2021-06-28 PROBLEM — I82.442 ACUTE DEEP VEIN THROMBOSIS (DVT) OF TIBIAL VEIN OF LEFT LOWER EXTREMITY (HCC): Status: ACTIVE | Noted: 2021-06-28

## 2022-02-21 PROBLEM — G56.22 CUBITAL TUNNEL SYNDROME ON LEFT: Status: ACTIVE | Noted: 2022-02-21

## 2022-02-21 PROBLEM — G56.02 CARPAL TUNNEL SYNDROME OF LEFT WRIST: Status: ACTIVE | Noted: 2022-02-21

## 2022-03-11 PROBLEM — I82.452 ACUTE DEEP VEIN THROMBOSIS (DVT) OF LEFT PERONEAL VEIN (HCC): Status: RESOLVED | Noted: 2021-06-28 | Resolved: 2022-03-11

## 2022-03-11 PROBLEM — I82.442 ACUTE DEEP VEIN THROMBOSIS (DVT) OF TIBIAL VEIN OF LEFT LOWER EXTREMITY (HCC): Status: RESOLVED | Noted: 2021-06-28 | Resolved: 2022-03-11

## 2022-03-11 PROBLEM — G95.89 OTHER SPECIFIED DISEASES OF SPINAL CORD (HCC): Status: ACTIVE | Noted: 2022-03-11

## 2022-04-07 PROBLEM — G95.89 OTHER SPECIFIED DISEASES OF SPINAL CORD (HCC): Status: RESOLVED | Noted: 2022-03-11 | Resolved: 2022-04-07

## 2022-04-07 PROBLEM — D35.02 ADRENAL ADENOMA, LEFT: Status: RESOLVED | Noted: 2018-12-17 | Resolved: 2022-04-07

## 2024-02-28 ENCOUNTER — LAB ENCOUNTER (OUTPATIENT)
Dept: LAB | Facility: HOSPITAL | Age: 64
End: 2024-02-28
Attending: INTERNAL MEDICINE
Payer: MEDICARE

## 2024-02-28 ENCOUNTER — MOBILE ENCOUNTER (OUTPATIENT)
Dept: GENERAL MEDICINE | Facility: HOSPITAL | Age: 64
End: 2024-02-28

## 2024-02-28 DIAGNOSIS — R31.9 HEMATURIA, UNSPECIFIED TYPE: ICD-10-CM

## 2024-02-28 DIAGNOSIS — R31.9 HEMATURIA, UNSPECIFIED TYPE: Primary | ICD-10-CM

## 2024-02-28 PROCEDURE — 86682 HELMINTH ANTIBODY: CPT

## 2024-02-28 PROCEDURE — 36415 COLL VENOUS BLD VENIPUNCTURE: CPT

## 2024-03-01 LAB — STRONG IGG AB: NEGATIVE

## 2024-03-06 LAB — Lab: 0.03 O.D

## 2024-11-21 RX ORDER — CARVEDILOL 3.12 MG/1
3.12 TABLET ORAL 2 TIMES DAILY WITH MEALS
COMMUNITY

## 2024-11-21 RX ORDER — CYCLOBENZAPRINE HCL 10 MG
10 TABLET ORAL 3 TIMES DAILY PRN
COMMUNITY

## 2024-11-21 RX ORDER — PANTOPRAZOLE SODIUM 40 MG/1
40 TABLET, DELAYED RELEASE ORAL
COMMUNITY

## 2024-11-21 RX ORDER — ASPIRIN 81 MG/1
81 TABLET ORAL DAILY
COMMUNITY
End: 2024-11-25

## 2024-11-25 ENCOUNTER — HOSPITAL ENCOUNTER (OUTPATIENT)
Facility: HOSPITAL | Age: 64
Setting detail: HOSPITAL OUTPATIENT SURGERY
Discharge: HOME | End: 2024-11-25
Attending: ORTHOPAEDIC SURGERY | Admitting: ORTHOPAEDIC SURGERY
Payer: MEDICARE

## 2024-11-25 ENCOUNTER — ANESTHESIA EVENT (OUTPATIENT)
Dept: SURGERY | Facility: HOSPITAL | Age: 64
End: 2024-11-25
Payer: MEDICARE

## 2024-11-25 ENCOUNTER — APPOINTMENT (OUTPATIENT)
Dept: GENERAL RADIOLOGY | Facility: HOSPITAL | Age: 64
End: 2024-11-25
Attending: ORTHOPAEDIC SURGERY
Payer: MEDICARE

## 2024-11-25 ENCOUNTER — ANESTHESIA (OUTPATIENT)
Dept: SURGERY | Facility: HOSPITAL | Age: 64
End: 2024-11-25
Payer: MEDICARE

## 2024-11-25 VITALS
SYSTOLIC BLOOD PRESSURE: 145 MMHG | HEART RATE: 82 BPM | OXYGEN SATURATION: 94 % | TEMPERATURE: 98 F | RESPIRATION RATE: 18 BRPM | BODY MASS INDEX: 26.66 KG/M2 | HEIGHT: 69 IN | WEIGHT: 180 LBS | DIASTOLIC BLOOD PRESSURE: 95 MMHG

## 2024-11-25 LAB
ANTIBODY SCREEN: NEGATIVE
RH BLOOD TYPE: POSITIVE

## 2024-11-25 PROCEDURE — 86900 BLOOD TYPING SEROLOGIC ABO: CPT | Performed by: ORTHOPAEDIC SURGERY

## 2024-11-25 PROCEDURE — 0RG10A0 FUSION OF CERVICAL VERTEBRAL JOINT WITH INTERBODY FUSION DEVICE, ANTERIOR APPROACH, ANTERIOR COLUMN, OPEN APPROACH: ICD-10-PCS | Performed by: ORTHOPAEDIC SURGERY

## 2024-11-25 PROCEDURE — 86901 BLOOD TYPING SEROLOGIC RH(D): CPT | Performed by: ORTHOPAEDIC SURGERY

## 2024-11-25 PROCEDURE — 86850 RBC ANTIBODY SCREEN: CPT | Performed by: ORTHOPAEDIC SURGERY

## 2024-11-25 PROCEDURE — 76000 FLUOROSCOPY <1 HR PHYS/QHP: CPT | Performed by: ORTHOPAEDIC SURGERY

## 2024-11-25 DEVICE — GRAFT BNE SUB PROCELLULAR SPNL MTRX OSTEOCEL: Type: IMPLANTABLE DEVICE | Site: SPINE CERVICAL | Status: FUNCTIONAL

## 2024-11-25 DEVICE — IMPLANTABLE DEVICE: Type: IMPLANTABLE DEVICE | Site: SPINE CERVICAL | Status: FUNCTIONAL

## 2024-11-25 DEVICE — MODULUS CERVICAL, 7X17X14MM 10°
Type: IMPLANTABLE DEVICE | Site: SPINE CERVICAL | Status: FUNCTIONAL
Brand: MODULUS

## 2024-11-25 DEVICE — I-FACTOR PUTTY, 1.0CC SYRINGE
Type: IMPLANTABLE DEVICE | Site: SPINE CERVICAL | Status: FUNCTIONAL
Brand: I-FACTOR PEPTIDE ENHANCED BONE GRAFT

## 2024-11-25 DEVICE — PLATE SPNL L20MM 1.6V ANT CERV TI ALLY LEV 1: Type: IMPLANTABLE DEVICE | Site: SPINE CERVICAL | Status: FUNCTIONAL

## 2024-11-25 RX ORDER — METHYLPREDNISOLONE ACETATE 40 MG/ML
INJECTION, SUSPENSION INTRA-ARTICULAR; INTRALESIONAL; INTRAMUSCULAR; SOFT TISSUE AS NEEDED
Status: DISCONTINUED | OUTPATIENT
Start: 2024-11-25 | End: 2024-11-25 | Stop reason: HOSPADM

## 2024-11-25 RX ORDER — HYDROMORPHONE HYDROCHLORIDE 1 MG/ML
0.2 INJECTION, SOLUTION INTRAMUSCULAR; INTRAVENOUS; SUBCUTANEOUS EVERY 5 MIN PRN
Status: DISCONTINUED | OUTPATIENT
Start: 2024-11-25 | End: 2024-11-25

## 2024-11-25 RX ORDER — HYDROMORPHONE HYDROCHLORIDE 1 MG/ML
0.4 INJECTION, SOLUTION INTRAMUSCULAR; INTRAVENOUS; SUBCUTANEOUS EVERY 5 MIN PRN
Status: DISCONTINUED | OUTPATIENT
Start: 2024-11-25 | End: 2024-11-25

## 2024-11-25 RX ORDER — ONDANSETRON 2 MG/ML
INJECTION INTRAMUSCULAR; INTRAVENOUS AS NEEDED
Status: DISCONTINUED | OUTPATIENT
Start: 2024-11-25 | End: 2024-11-25 | Stop reason: SURG

## 2024-11-25 RX ORDER — HYDROMORPHONE HYDROCHLORIDE 1 MG/ML
0.6 INJECTION, SOLUTION INTRAMUSCULAR; INTRAVENOUS; SUBCUTANEOUS EVERY 5 MIN PRN
Status: DISCONTINUED | OUTPATIENT
Start: 2024-11-25 | End: 2024-11-25

## 2024-11-25 RX ORDER — ACETAMINOPHEN 500 MG
1000 TABLET ORAL ONCE
Status: DISCONTINUED | OUTPATIENT
Start: 2024-11-25 | End: 2024-11-25 | Stop reason: HOSPADM

## 2024-11-25 RX ORDER — ACETAMINOPHEN 500 MG
1000 TABLET ORAL ONCE AS NEEDED
Status: COMPLETED | OUTPATIENT
Start: 2024-11-25 | End: 2024-11-25

## 2024-11-25 RX ORDER — SODIUM CHLORIDE, SODIUM LACTATE, POTASSIUM CHLORIDE, CALCIUM CHLORIDE 600; 310; 30; 20 MG/100ML; MG/100ML; MG/100ML; MG/100ML
INJECTION, SOLUTION INTRAVENOUS CONTINUOUS
Status: DISCONTINUED | OUTPATIENT
Start: 2024-11-25 | End: 2024-11-25

## 2024-11-25 RX ORDER — VANCOMYCIN HYDROCHLORIDE 1 G/20ML
INJECTION, POWDER, LYOPHILIZED, FOR SOLUTION INTRAVENOUS AS NEEDED
Status: DISCONTINUED | OUTPATIENT
Start: 2024-11-25 | End: 2024-11-25 | Stop reason: HOSPADM

## 2024-11-25 RX ORDER — LIDOCAINE HYDROCHLORIDE 10 MG/ML
INJECTION, SOLUTION EPIDURAL; INFILTRATION; INTRACAUDAL; PERINEURAL AS NEEDED
Status: DISCONTINUED | OUTPATIENT
Start: 2024-11-25 | End: 2024-11-25 | Stop reason: SURG

## 2024-11-25 RX ORDER — DEXAMETHASONE SODIUM PHOSPHATE 4 MG/ML
VIAL (ML) INJECTION AS NEEDED
Status: DISCONTINUED | OUTPATIENT
Start: 2024-11-25 | End: 2024-11-25 | Stop reason: SURG

## 2024-11-25 RX ORDER — SCOLOPAMINE TRANSDERMAL SYSTEM 1 MG/1
1 PATCH, EXTENDED RELEASE TRANSDERMAL ONCE
Status: DISCONTINUED | OUTPATIENT
Start: 2024-11-25 | End: 2024-11-25 | Stop reason: HOSPADM

## 2024-11-25 RX ORDER — HYDROCODONE BITARTRATE AND ACETAMINOPHEN 5; 325 MG/1; MG/1
2 TABLET ORAL ONCE AS NEEDED
Status: COMPLETED | OUTPATIENT
Start: 2024-11-25 | End: 2024-11-25

## 2024-11-25 RX ORDER — HYDROCODONE BITARTRATE AND ACETAMINOPHEN 5; 325 MG/1; MG/1
1 TABLET ORAL ONCE AS NEEDED
Status: COMPLETED | OUTPATIENT
Start: 2024-11-25 | End: 2024-11-25

## 2024-11-25 RX ORDER — VALACYCLOVIR HYDROCHLORIDE 1 G/1
TABLET, FILM COATED ORAL
COMMUNITY
Start: 2024-06-12

## 2024-11-25 RX ORDER — NALOXONE HYDROCHLORIDE 0.4 MG/ML
0.08 INJECTION, SOLUTION INTRAMUSCULAR; INTRAVENOUS; SUBCUTANEOUS AS NEEDED
Status: DISCONTINUED | OUTPATIENT
Start: 2024-11-25 | End: 2024-11-25

## 2024-11-25 RX ORDER — MIDAZOLAM HYDROCHLORIDE 1 MG/ML
1 INJECTION INTRAMUSCULAR; INTRAVENOUS EVERY 5 MIN PRN
Status: DISCONTINUED | OUTPATIENT
Start: 2024-11-25 | End: 2024-11-25

## 2024-11-25 RX ORDER — ONDANSETRON 2 MG/ML
4 INJECTION INTRAMUSCULAR; INTRAVENOUS EVERY 6 HOURS PRN
Status: DISCONTINUED | OUTPATIENT
Start: 2024-11-25 | End: 2024-11-25

## 2024-11-25 RX ORDER — PROCHLORPERAZINE EDISYLATE 5 MG/ML
5 INJECTION INTRAMUSCULAR; INTRAVENOUS EVERY 8 HOURS PRN
Status: DISCONTINUED | OUTPATIENT
Start: 2024-11-25 | End: 2024-11-25

## 2024-11-25 RX ADMIN — SODIUM CHLORIDE, SODIUM LACTATE, POTASSIUM CHLORIDE, CALCIUM CHLORIDE: 600; 310; 30; 20 INJECTION, SOLUTION INTRAVENOUS at 09:01:00

## 2024-11-25 RX ADMIN — ONDANSETRON 4 MG: 2 INJECTION INTRAMUSCULAR; INTRAVENOUS at 08:45:00

## 2024-11-25 RX ADMIN — DEXAMETHASONE SODIUM PHOSPHATE 10 MG: 4 MG/ML VIAL (ML) INJECTION at 07:45:00

## 2024-11-25 RX ADMIN — LIDOCAINE HYDROCHLORIDE 100 MG: 10 INJECTION, SOLUTION EPIDURAL; INFILTRATION; INTRACAUDAL; PERINEURAL at 07:35:00

## 2024-11-25 NOTE — OPERATIVE REPORT
Operative Note     Patient Name: Hernando Bishop  Date: 11/25/2024  Preoperative Diagnosis: Cervical Radiculopathy and spondylolisthesis  Postoperative Diagnosis: Same  Primary Surgeon: Evin Daniel MD  Assistant:Jackie MARSHALL  Procedures:   C4-5 Anterior Cervical Discectomy/Fusion CPT 47520   C4-5 Anterior Instrumentation   CPT 48288  Use of Titanium Interbody Graft    CPT 20853 x1      Anesthesia: General Endotracheal Anesthesia  Estimated Blood Loss: 20cc  Drains: None  Implants:   Nuvasive C360 Anterior Cervical Plate and Screws 17mm  Nuvasive Modulus interbody cage [id number] x 6height  Bone Graft: osteocel   Specimen: None  Condition: Stable  Complications: None     Surgical Indications:   Hernando Bishop is an 64 year old male who presents with neck issues refractory to nonsurgical care.  The option of surgery was discussed with the patient.  Risks include, but are not limited to wound complications, bleeding, infection, neurologic injury or onset of a new neurological issue including paralysis, dural tear, pseudoarthrosis (particularly in longer constructs and in smokers/diabetics), fracture, hardware failure, adjacent segment or junctional breakdown, recurrent laryngeal nerve palsy, Wily's syndrome, dysphagia or dysphonia (These are usually temporary and will resolve within a few weeks to few months timeframe), hematoma requiring evacuation, repeat intubation after surgery, esophageal or tracheal injury, vertebral artery injury (stroke), and the need for possible additional future surgery.  We also discussed the possible persistence of symptoms and adjacent segment degeneration.  The patient verbalized their understanding and wished to proceed with surgery.        Surgical Procedure:   After careful identification patient was taken to the OR, the patient was administered prophylactic antibiotics.  After successful induction of general endotracheal anesthesia, the patient was positioned supine on the  operating table with gentle neck extension and securing the arms to the side with gentle traction with shoulder tape.  All bony prominences were padded and protected. The neck was then prepped and draped in the usual sterile fashion.   A safety timeout was performed identifying the patient by name, MRN, and date of birth; the nature of the procedure, surgical site, and concerns were addressed with the operating room staff. All were in agreement and we proceeded with the procedure.                A transverse incision, consistent with the Navas-Cash approach, was made.  The skin was incised with a scalpel and subcutaneous dissection was carried out with electrocautery.  The subplatysmal membrane was then dissected free.  The plane between the deep cervical fascia and the carotid sheath was developed bluntly.  The carotid was palpated, protected, and retracted laterally.  After exposing the prevertebral fascia, yusra clamp was used to identify the disc space. A lateral flouro was then obtained to confirm the appropriate surgical level(s) prior to proceeding with discectomy.  The edges of the longus coli muscles elevated from their undersurface with  electrocautery from C4-5.  The Trimline retractor of appropriate size was placed.  Mobile pins were then placed at the most cephalad and caudal vertebral bodies.       Mild distraction of the interspace was afforded at C4-5. A #15 blade was used to create an initial annulotomy. Complete discectomy was complete from uncinate to uncinate with a combination of curettes, pituitary and Kerrison rongeurs.  The overhanging osteophyte at the cephalad cervical body was resected and saved for bone grafting.  After decompressing the posterior osteophytes and the neuroforamen, the PLL was resected with a #2 Kerrison.  A blunt nerve probe can then be passed into the bilateral neural foramen without any resistance.  Next high-speed bur was utilized to decorticate the most lateral  aspects of the disc space.  Next we trialed and then placed an appropriate sized titanium interbody device packed with local bone graft and allograft into position.  Bone graft was then deposited into the disc space on the lateral aspects.     At this point the caspar pins were then removed, hemostasis was achieved an appropriate sized plate was selected and applied.  The plate was a separate plate and not integral to the interbody implant.Screws were placed and a radiograph obtained to confirm position of the graft and evaluate plate fixation     All bleeders were meticulously controlled using bipolar electrocautery and floseal. The remainder of the floseal was mixed with 40mg of depomedrol and placed over the plate for minimization of dysphagia. 100mg of vancomycin powder was placed in the wound. The wound was thoroughly irrigated at the time of closure.  There was no active bleeding.  Closure was done in layers with interrupted 2-0 Vicryl for the fascia, 3-0 vicry for subcutaneous incision.  The skin was closed with a Monocryl suture.  Steri-Strips and a sterile dressing were then applied.  The patient was woken from anesthesia and transferred to the PACU in stable condition.                  A physician assistant was necessary during the case to provide positioning, exposure, hemostasis, safety and retraction and to manage wound suction so that I could operate with two hands     Evin Daniel MD  Division of Spine Surgery  [facility]

## 2024-11-25 NOTE — BRIEF OP NOTE
Pre-Operative Diagnosis: CERVICAL RADICULOPATHY     Post-Operative Diagnosis: CERVICAL RADICULOPATHY      Procedure Performed:   CERVICAL 4- CERVICAL 5 REVISION ANTERIOR CERVICAL DISCECTOMY FUSION,  INTRAOPERATIVE NEURO MONITORING    Surgeons and Role:     * Evin Daniel MD - Primary    Assistant(s):  PA: Jackie Matamoros PA-C     Surgical Findings: Above     Specimen: none     Estimated Blood Loss: Blood Output: 20 mL (11/25/2024  8:37 AM)      Dictation Number:      Jackie Matamoros PA-C  11/25/2024  8:58 AM

## 2024-11-25 NOTE — ANESTHESIA PREPROCEDURE EVALUATION
PRE-OP EVALUATION    Patient Name: Hernando Bishop    Admit Diagnosis: CERVICAL RADICULOPATHY    Pre-op Diagnosis: CERVICAL RADICULOPATHY    CERVICAL 4- CERVICAL 5 REVISION ANTERIOR CERVICAL DISCECTOMY FUSION, CERVICAL 5- CERVICAL 6, CERVICAL 6- CERVICAL 7 PLATE REMOVAL; INTRAOPERATIVE NEURO MONITORING    Anesthesia Procedure: CERVICAL 4- CERVICAL 5 REVISION ANTERIOR CERVICAL DISCECTOMY FUSION, CERVICAL 5- CERVICAL 6, CERVICAL 6- CERVICAL 7 PLATE REMOVAL; INTRAOPERATIVE NEURO MONITORING (Spine Cervical)  INTRAOPERATIVE NEURO MONITORING    Surgeons and Role:     * Evin Daniel MD - Primary    Pre-op vitals reviewed.  Temp: 98.5 °F (36.9 °C)  Pulse: 69  Resp: 16  BP: 118/79  SpO2: 99 %  Body mass index is 26.58 kg/m².    Current medications reviewed.  Hospital Medications:   acetaminophen (Tylenol Extra Strength) tab 1,000 mg  1,000 mg Oral Once    scopolamine (Transderm-Scop) 1 MG/3DAYS patch 1 patch  1 patch Transdermal Once    lactated ringers infusion   Intravenous Continuous    ceFAZolin (Ancef) 2g in 10mL IV syringe premix  2 g Intravenous Once       Outpatient Medications:   Prescriptions Prior to Admission[1]    Allergies: Patient has no known allergies.      Anesthesia Evaluation    Patient summary reviewed.    Anesthetic Complications  (-) history of anesthetic complications         GI/Hepatic/Renal    Negative GI/hepatic/renal ROS.                             Cardiovascular  Comment: PE s/p IVC filter                (+) hypertension                                     Endo/Other    Negative endo/other ROS.                              Pulmonary                    (+) sleep apnea       Neuro/Psych      (+) depression  (+) anxiety         (+) neuromuscular disease                     Past Surgical History:   Procedure Laterality Date    Back surgery  2018    back fusion--Dr. Jimmy Carver    Back surgery  2020    neck fusion--Dr. Jimmy Carver    Carpal tunnel release Right 09/11/2019    Dr. Faustino Carr     Cath ablation  2012    for afib; Dr. Woody    Colonoscopy  2012    repeat 10 years    Colonoscopy      Hernia surgery  2010    Ndsc ablation & rcnstj [facility] w/o bypass      Spinal fusion      Spine surgery procedure unlisted       Social History     Socioeconomic History    Marital status:     Number of children: 3   Occupational History    Occupation: disability    Occupation: prev    Tobacco Use    Smoking status: Never    Smokeless tobacco: Never   Vaping Use    Vaping status: Never Used   Substance and Sexual Activity    Alcohol use: Yes    Drug use: No     History   Drug Use No     Available pre-op labs reviewed.               Airway      Mallampati: II  Mouth opening: >3 FB  TM distance: > 6 cm  Neck ROM: full Cardiovascular    Cardiovascular exam normal.  Rhythm: regular  Rate: normal     Dental  Comment: Denies chipped or loose teeth           Pulmonary    Pulmonary exam normal.  Breath sounds clear to auscultation bilaterally.               Other findings              ASA: 2   Plan: general  NPO status verified and patient meets guidelines.    Post-procedure pain management plan discussed with surgeon and patient.    Comment: Risks include but limited to oral and dental injury, nausea/vomiting, anaphylaxis, prolonged ventilation and myocardial infarction. All questions were answered to the patient's satisfaction. Patient expressed understanding and wishes to proceed.   Plan/risks discussed with: patient                Present on Admission:  **None**             [1]   Medications Prior to Admission   Medication Sig Dispense Refill Last Dose/Taking    cyclobenzaprine 10 MG Oral Tab Take 1 tablet (10 mg total) by mouth 3 (three) times daily as needed for Muscle spasms.   11/24/2024 Bedtime    carvedilol 3.125 MG Oral Tab Take 1 tablet (3.125 mg total) by mouth 2 (two) times daily with meals.   11/25/2024 at  3:00 AM    aspirin 81 MG Oral Tab EC Take 1 tablet (81 mg total) by mouth  daily.   11/18/2024    Melatonin 3 MG Oral Cap Take by mouth nightly as needed.   11/23/2024    Multiple Vitamin (MULTIVITAMIN OR) Take by mouth.   Past Week    LISINOPRIL 10 MG Oral Tab TAKE 1 TABLET(10 MG) BY MOUTH DAILY (Patient taking differently: 2 tablets (20 mg total).) 90 tablet 0 11/23/2024    HYDROcodone-acetaminophen 5-325 MG Oral Tab Take 1 tablet by mouth every 4 (four) hours as needed for Pain. 20 tablet 0 11/24/2024 Bedtime    hydrochlorothiazide 12.5 MG Oral Tab Take 1 tablet (12.5 mg total) by mouth daily. 90 tablet 0 11/25/2024 at  3:00 AM    Sertraline HCl 100 MG Oral Tab Take 2 tablets (200 mg total) by mouth every evening. 60 tablet 2 11/24/2024    Ketoconazole 2 % External Shampoo Wash face and scalp daily ;leave on for 2-3 min and then rinse off 120 mL 5 Taking    valACYclovir 1 G Oral Tab    More than a month    pantoprazole 40 MG Oral Tab EC Take 1 tablet (40 mg total) by mouth daily as needed.   More than a month    hydrocortisone 2.5 % External Cream Apply to face qd x 5 days when flaring then DC 30 g 2

## 2024-11-25 NOTE — ANESTHESIA POSTPROCEDURE EVALUATION
[facility]    Hernando Bishop Patient Status:  Hospital Outpatient Surgery   Age/Gender 64 year old male MRN [id number]   Location [facility] POST ANESTHESIA CARE UNIT Attending Evin Daniel MD   Hosp Day # 0 PCP Etelvina Arango MD       Anesthesia Post-op Note    CERVICAL 4- CERVICAL 5 REVISION ANTERIOR CERVICAL DISCECTOMY FUSION,  INTRAOPERATIVE NEURO MONITORING    Procedure Summary       Date: 11/25/24 Room / Location:  MAIN OR 11 / [facility] MAIN OR    Anesthesia Start: 0730 Anesthesia Stop: 0901    Procedures:       CERVICAL 4- CERVICAL 5 REVISION ANTERIOR CERVICAL DISCECTOMY FUSION,  INTRAOPERATIVE NEURO MONITORING (Spine Cervical)      INTRAOPERATIVE NEURO MONITORING Diagnosis: (CERVICAL RADICULOPATHY)    Surgeons: Evin Daniel MD Anesthesiologist: Gisela De La Rosa MD    Anesthesia Type: general ASA Status: 2            Anesthesia Type: general    Vitals Value Taken Time   /103 11/25/24 0919   Temp 98 11/25/24 0921   Pulse 66 11/25/24 0920   Resp 8 11/25/24 0919   SpO2 98 % 11/25/24 0920   Vitals shown include unfiled device data.    Patient Location: PACU    Anesthesia Type: general    Airway Patency: patent and extubated    Postop Pain Control: adequate    Mental Status: mildly sedated but able to meaningfully participate in the post-anesthesia evaluation    Nausea/Vomiting: none    Cardiopulmonary/Hydration status: stable euvolemic    Complications: no apparent anesthesia related complications    Postop vital signs: stable    Dental Exam: Unchanged from Preop    Patient to be discharged from PACU when criteria met.

## 2024-11-25 NOTE — ANESTHESIA PROCEDURE NOTES
Airway  Date/Time: 11/25/2024 7:37 AM  Urgency: elective    Airway not difficult    General Information and Staff    Patient location during procedure: OR  Anesthesiologist: Gisela De La Rosa MD  Performed: anesthesiologist   Performed by: Gisela De La Rosa MD  Authorized by: Gisela De La Rosa MD      Indications and Patient Condition  Indications for airway management: anesthesia  Spontaneous Ventilation: absent  Sedation level: deep  Preoxygenated: yes  Patient position: sniffing  Mask difficulty assessment: 0 - not attempted    Final Airway Details  Final airway type: endotracheal airway      Successful airway: ETT  Cuffed: yes   Successful intubation technique: Video laryngoscopy  Endotracheal tube insertion site: oral  Blade: GlideScope  Blade size: #3  ETT size (mm): 7.0    Cormack-Lehane Classification: grade I - full view of glottis  Placement verified by: capnometry   Measured from: lips  ETT to lips (cm): 23  Number of attempts at approach: 1

## 2024-11-25 NOTE — H&P
Patient was seen today, 11/25/24, and wishes to proceed with surgical intervention. No changes from previous visit.   Agree with below:   Hernando Bishop is a 64 year old male.  Patient presents with:  Pre-Op Exam: Pt is schedule for neck fusion on November 25 with Dr Daniel    HPI:  Hernando Bishop is a 64 year old male has had several months of radiating neck pain with hx of previous fusion and is here for pre-op appt for C5-7 revision anterior cervical discectomy fusion by Dr. Daniel. Will be having IVC filter placed 11/20/24    Patient denies any problems with anesthesia in the past.  Patient is able to walk up 2 flights of stairs without chest pain or shortness of breath.    Didn't take BP meds this am    ROS:  GENERAL HEALTH: feeling otherwise well. No fevers. No chills. No weight gain or weight loss. No fatigue  EYES: no visual changes + glasses  HEENT: No headaches. Denies nasal congestion, sinus pain or sore throat. No hearing loss or changes. + snoring - uses oral device.  RESPIRATORY: denies shortness of breath, wheezing or cough  CARDIOVASCULAR: denies chest pain or DIAZ; no palpitations. No dizziness. No lower extremity edema.  GI: No abdominal pain. No appetite changes. Denies nausea, vomiting, constipation, diarrhea. No rectal bleeding. occ heartburn - not new or changed - on pantoprazole with GI. No trouble swallowing.  MUSCULOSKELETAL: neck pain radiating b/l arms   : No difficulty urinating. No blood in the urine. No dysuria. No urinary frequency. No nocturia  NEURO: No seizures. + numbness in arms. No speech abnormalities  PSYCH: No symptoms of depression or anxiety.  HEMATOLOGY: Denies easy bruising. Denies excessive bleeding  ENDOCRINE: No heat or cold intolerance. No excessive sweating. No excessive thirst or urination.  ALLERGY/IMM: Denies food allergies or seasonal allergies  SKIN: denies any unusual skin lesions or rashes.    Past Medical History:  Diagnosis Date  Acute deep vein thrombosis  (DVT) of left peroneal vein ([facility]) 06/28/2021 6/21/2021 OV note  Acute deep vein thrombosis (DVT) of tibial vein of left lower extremity ([facility]) 06/28/2021 6/23/2021 OV note  Anxiety  Arrhythmia  a-fib (had an ablation in 2011)  Atrial fibrillation ([facility])  s/p ablation  Back pain  Carpal tunnel syndrome of left wrist 08/14/2019  Carpal tunnel syndrome of right wrist 08/14/2019  Cervical radicular pain 03/15/2021  Complex tear of medial meniscus of right knee as current injury 10/31/2022  COVID 01/2022  Cubital tunnel syndrome on left 02/21/2022  Depression  High blood pressure  High cholesterol  HTN (hypertension)  Kidney stone 2018  Multilevel spondylosis  MICHAEL (obstructive sleep apnea)  2017 ([facility]) : AHI 14.4. Not on CPAP  Other specified diseases of spinal cord ([facility]) 03/11/2022  PONV (postoperative nausea and vomiting)  Pulmonary embolism ([facility]) 04/20/2020  s/p anticoagulation for 6 months.  Sleep apnea  TBI (traumatic brain injury) ([facility]) 11/05/2014  fall at work  Visual impairment  glasses    Past Surgical History:  Procedure Laterality Date  ARTHROSCOPY OF JOINT UNLISTED Right 12/16/2022  [place]  BACK SURGERY 2018  back fusion--Dr. Jimmy Carver. post op PE/DVT  BACK SURGERY 2020  neck fusion--Dr. Jimmy Carver  CARPAL TUNNEL RELEASE Right 09/11/2019  Dr. Faustino Carr  CARPAL TUNNEL RELEASE Left 04/21/2022  Dr. Faustino Carr  CATH ABLATION 2012  for afib; Dr. Woody  CERVICAL TRANSFORAMINAL EPIDURAL INJECTION ([facility])  aug 2024  COLONOSCOPY 2012  repeat 10 years  CYSTOURETHROSCOPY 04/22/2022    CYSTOURETHROSCOPY 02/20/2024  Dr. Mir  ELBOW SURGERY Left 04/21/2022--Dr. Faustino Carr  possible cubital tunnel release?  HERNIA SURGERY 2010  NDSC ABLATION & RCNSTJ ATRIA LMTD W/O BYPASS  SPINAL FUSION    Family History  Problem Relation Age of Onset  Heart Disorder Mother  a fib  Hypertension Mother  Obesity Mother  Psychiatric Mother  depression  Cancer Father  stomach/ Duodenal Adenocarcinoma  Heart Disorder Father  a  fib  Hypertension Father  Obesity Father  Psychiatric Father  depression  Cancer Sister  breast CA  No Known Problems Sister  No Known Problems Sister  No Known Problems Sister  Other (Other) Brother  cardiac ablation - afib  No Known Problems Maternal Grandmother  Diabetes Maternal Grandfather  Heart Disorder Maternal Grandfather  heart attack  No Known Problems Paternal Grandmother  Diabetes Paternal Grandfather  No Known Problems Son  No Known Problems Son  No Known Problems Son    Social History  Tobacco Use  Smoking status: Never  Smokeless tobacco: Never  Vaping Use  Vaping status: Never Used  Alcohol use: Yes  Comment: socially  Drug use: No    Allergies:  No Known Allergies  Current Meds:  Current Outpatient Medications  Medication Sig Dispense Refill  HYDROcodone-acetaminophen (NORCO) 5-325 MG Oral Tab Take 1 tablet by mouth 2 (two) times a day. 60 tablet 0  cyclobenzaprine 10 MG Oral Tab Take 1 tablet (10 mg total) by mouth 3 (three) times daily as needed for Muscle spasms. 30 tablet 0  carvedilol (COREG) 3.125 MG Oral Tab Take 1 tablet (3.125 mg total) by mouth 2 (two) times daily. 180 tablet 3  hydrochlorothiazide 12.5 MG Oral Tab Take 1 tablet (12.5 mg total) by mouth every morning. 90 tablet 3  lisinopril 20 MG Oral Tab Take 1 tablet (20 mg total) by mouth 2 (two) times a day. 180 tablet 3  Multiple Vitamin (MULTI-VITAMIN DAILY) Oral Tab Take by mouth.  aspirin 81 MG Oral Tab EC Take 1 tablet (81 mg total) by mouth daily. 90 tablet 3  melatonin 3 MG Oral Tab Take 3 mg by mouth nightly as needed.  Ketoconazole 2 % External Shampoo Wash face and scalp daily ;leave on for 2-3 min and then rinse off 120 mL 5  pantoprazole 40 MG Oral Tab EC Take 1 tablet (40 mg total) by mouth every morning before breakfast. 30 tablet 5      PHYSICAL EXAM:  /80 (BP Location: Left arm, Patient Position: Sitting, Cuff Size: adult)  Pulse 72  Resp 16  Ht 5' 9\" (1.753 m)  Wt 180 lb (81.6 kg)  SpO2 97%  BMI 26.58  kg/m²  GENERAL: well developed, well nourished, in no apparent distress  SKIN: no rashes, no suspicious lesions  EYES: PERRL, EOMI, sclera anicteric, conjunctiva normal  HEENT: normocephalic/atraumatic; tympanic membranes normal bilaterally, clear conjuctiva, Extra Ocular Muscles Intact (EOMI), eyes normal, nonicteric, Pupils Equal, Round, Reactive to Light and Accommodation (PERRLA), nose clear, Oropharynx clear with moist mucous membranes  NECK: supple; no thyromegaly, no lymphadenopathy, no carotid bruit  RESPIRATORY: clear to auscultation bilaterally, no wheeze, no crackles.  CARDIOVASCULAR: S1, S2 normal, RRR; no S3, no S4; no click or rubs; no murmur  ABDOMEN: soft, non-tender, nondistended, no organomegaly, normal active bowel sounds  LYMPHATIC: no lymphadenopathy  MUSCULOSKELETAL: 4/5 RUE  BACK: No spinal tenderness  EXTREMITIES: no cyanosis, clubbing or edema  NEUROLOGIC: no sensory deficit; reflexes normal, alert and oriented x 3  PSYCHIATRIC: appropriate mood and affect    ASSESSMENT/ PLAN:    Diagnoses and all orders for this visit:    Preop examination  - CBC WITH DIFFERENTIAL WITH PLATELET; Future  - COMPREHENSIVE METABOLIC PANEL; Future  - PROTHROMBIN TIME (PT) AND PARTIAL THROMBOPLASTIN TIME (PTT); Future  - URINALYSIS, COMPLETE WITH MICROSCOPIC EXAMINATION WITH REFLEX TO URINE CULTURE, ROUTINE; Future  - MRSA / MSSA PRE-OP; Future  - XR CHEST PA + LAT CHEST (CPT=71046); Future  Cardio eval done 10/30/24  \"-pt is asymptomatic with good functional capacity (>4 METS)  -normal nuc stress test 02/24  -ok to hold aspirin 7 days prior  -there are no cardiac contraindications to proceeding with surgery\"    Cervical radiculopathy  - CBC WITH DIFFERENTIAL WITH PLATELET; Future  - COMPREHENSIVE METABOLIC PANEL; Future  - PROTHROMBIN TIME (PT) AND PARTIAL THROMBOPLASTIN TIME (PTT); Future  - URINALYSIS, COMPLETE WITH MICROSCOPIC EXAMINATION WITH REFLEX TO URINE CULTURE, ROUTINE; Future  - MRSA / MSSA PRE-OP;  Future  - XR CHEST PA + LAT CHEST (CPT=71046); Future  Medically appropriate for surgery pending above    Cervical stenosis of spinal canal  - CBC WITH DIFFERENTIAL WITH PLATELET; Future  - COMPREHENSIVE METABOLIC PANEL; Future  - PROTHROMBIN TIME (PT) AND PARTIAL THROMBOPLASTIN TIME (PTT); Future  - URINALYSIS, COMPLETE WITH MICROSCOPIC EXAMINATION WITH REFLEX TO URINE CULTURE, ROUTINE; Future  - MRSA / MSSA PRE-OP; Future  - XR CHEST PA + LAT CHEST (CPT=71046); Future  Medically appropriate for surgery pending above    History of pulmonary embolism  Plan for IVC filter 11/20    Essential hypertension  Will take meds, did not take today to fast for labs, and return for RN visit for BP recheck  Cont coreg, hydrochlorothiazide & lisinopril    Thoracic aortic atherosclerosis (HCC)  Declines statin  On aspirin 81mg  Follows with cardio    Tortuous aorta (HCC)  Declines statin  On aspirin 81mg  Follows with cardio    History of traumatic brain injury  Stable    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 Or sooner prn or pending above    11/11/24  Repeat BP in office today is 142/78  Labs reviewed  Medically appropriate for surgery  Electronically signed by Etelvina Arango MD at 11/11/2024 3:35 PM CST

## (undated) DEVICE — #15 STERILE STAINLESS BLADE: Brand: STERILE STAINLESS BLADES

## (undated) DEVICE — Device: Brand: INTELLICART™

## (undated) DEVICE — COVER,TABLE,HVY DUTY,EXT,77"X96",STRL: Brand: MEDLINE

## (undated) DEVICE — TRANSPOSAL ULTRAFLEX DUO/QUAD ULTRA CART MANIFOLD

## (undated) DEVICE — DRAPE C-ARM UNIVERSAL

## (undated) DEVICE — E-Z CLEAN, NON-STICK, PTFE COATED, ELECTROSURGICAL BLADE ELECTRODE, MODIFIED EXTENDED INSULATION, 4 INCH (10.2 CM): Brand: MEGADYNE

## (undated) DEVICE — C-ARMOR C-ARM EQUIPMENT COVERS CLEAR STERILE UNIVERSAL FIT 12 PER CASE: Brand: C-ARMOR

## (undated) DEVICE — MAXCESS-C SCREW 16 DISTRACTION

## (undated) DEVICE — WRAP COOLING BACK W/NO PILLOW

## (undated) DEVICE — 3.0MM PRECISION NEURO (MATCH HEAD)

## (undated) DEVICE — ELECTRODE ES 2.75IN PTFE BLDE MOD E-Z CLN

## (undated) DEVICE — KIT HEMSTAT MTRX 8ML PORCINE GEL HUM THROM

## (undated) DEVICE — MARKER SKIN PREP-RESISTANT ST: Brand: MEDLINE INDUSTRIES, INC.

## (undated) DEVICE — NV CLIP DSC&IN-LINE ACTIVATOR

## (undated) DEVICE — PROXIMATE SKIN STAPLERS (35 WIDE) CONTAINS 35 STAINLESS STEEL STAPLES (FIXED HEAD): Brand: PROXIMATE

## (undated) DEVICE — 450 ML BOTTLE OF 0.05% CHLORHEXIDINE GLUCONATE IN 99.95% STERILE WATER FOR IRRIGATION, USP AND APPLICATOR.: Brand: IRRISEPT ANTIMICROBIAL WOUND LAVAGE

## (undated) DEVICE — GRAFT DELIVERY SYS MODULE

## (undated) DEVICE — PAD SACRAL SPAN AID

## (undated) DEVICE — RELINE POWER

## (undated) DEVICE — DRILL SRG OIL CRTDG MAESTRO

## (undated) DEVICE — SOL  .9 1000ML BTL

## (undated) DEVICE — DRAPE,TOWEL,LARGE,INVISISHIELD: Brand: MEDLINE

## (undated) DEVICE — SLEEVE COMPR MD KNEE LEN SGL USE KENDALL SCD

## (undated) DEVICE — UNDYED BRAIDED (POLYGLACTIN 910), SYNTHETIC ABSORBABLE SUTURE: Brand: COATED VICRYL

## (undated) DEVICE — FLOSEAL HEMOSTATIC MATRIX, 5ML: Brand: FLOSEAL HEMOSTATIC MATRIX

## (undated) DEVICE — STERILE POLYISOPRENE POWDER-FREE SURGICAL GLOVES: Brand: PROTEXIS

## (undated) DEVICE — COVER,LIGHT,CAMERA,HARD,1/PK,STRL: Brand: MEDLINE

## (undated) DEVICE — SUTURE VICRYL 1 OS-6

## (undated) DEVICE — SUT MCRYL 3-0 27IN ABSRB UD 19MM PS-2 3/8

## (undated) DEVICE — LIGHT HANDLE

## (undated) DEVICE — GOWN SUR 2XL LEV 4 BLU W/ WHT V NK BND AERO

## (undated) DEVICE — SHEET,DRAPE,70X100,STERILE: Brand: MEDLINE

## (undated) DEVICE — GLOVE SUR 7.5 SENSICARE PI PIP CRM PWD F

## (undated) DEVICE — COVER,MAYO STAND,STERILE: Brand: MEDLINE

## (undated) DEVICE — Device

## (undated) DEVICE — WIRE FX PRCPT KRSH BVL BLNT

## (undated) DEVICE — NUVAMAP O.R. TECHNOLOGY

## (undated) DEVICE — MAXCESS C MODULE

## (undated) DEVICE — LAMINECTOMY CDS: Brand: MEDLINE INDUSTRIES, INC.

## (undated) DEVICE — MONITORING NEUROPHYSIOLOGICAL

## (undated) DEVICE — NV I-PAS III DIAMOND TIP

## (undated) DEVICE — BLDE PRCPT FSCL SPLTR DISP

## (undated) DEVICE — APPLICATOR PREP 26ML CHG 2% ISO ALC 70%

## (undated) DEVICE — ANTIBACTERIAL UNDYED BRAIDED (POLYGLACTIN 910), SYNTHETIC ABSORBABLE SUTURE: Brand: COATED VICRYL

## (undated) DEVICE — ELECTRODE ES L10.2CM BLDE L4IN EXT MPLR OPN

## (undated) DEVICE — TZ MEDICAL TITANIUM DISTRACTION PINS 14MM: Brand: TZ MEDICAL TITANIUM DISTRACTION PINS

## (undated) DEVICE — DRAPE TABLE COVER 44X90 TC-10

## (undated) DEVICE — SUTURE VICRYL 2-0 FSL

## (undated) DEVICE — KENDALL SCD EXPRESS SLEEVES, THIGH LENGTH, MEDIUM: Brand: KENDALL SCD

## (undated) DEVICE — 3M™ MICROFOAM™ TAPE 1528-4: Brand: 3M™ MICROFOAM™

## (undated) DEVICE — 11.1-M5 MULTIMODALITY KIT 5

## (undated) DEVICE — COVER,TABLE,44X90,STERILE: Brand: MEDLINE

## (undated) DEVICE — C-ARM: Brand: UNBRANDED

## (undated) DEVICE — SOLUTION IRRIG 1000ML 0.9% NACL USP BTL

## (undated) NOTE — ED AVS SNAPSHOT
Margaretha Cushing   MRN: [id number]    Department:  [facility] Emergency Department   Date of Visit:  2/13/2019           Disclosure     Insurance plans vary and the physician(s) referred by the ER may not be covered by your plan.  Please contact your tell this physician (or your personal doctor if your instructions are to return to your personal doctor) about any new or lasting problems. The primary care or specialist physician will see patients referred from the [facility] Emergency Department.  Lianne Garibay

## (undated) NOTE — LETTER
Last Revised 02/07/06  Obstructive Sleep Apnea Questionnaire    Clinical signs and symptoms suggesting the possibility of MICHAEL    1. Predisposing physical characteristics (positive with any of the following present)  ? BMI 35kg/m²  ?  Craniofacial abnormalit pauses which are frightening to the observer, patient regularly falls asleep within minutes after being left unstimulated) in which case they should be treated as though they have severe sleep apnea.     The sleep laboratory’s assessment (none, mild, modera Point Total for B           C. Requirement for postoperative opioids.                Opioid requirement             Points   None 0    Low dose oral opiod 1    High dose oral opioids, parenteral or neuraxial opiods 3      Point Total for C        Estimation